# Patient Record
Sex: FEMALE | Race: WHITE | NOT HISPANIC OR LATINO | URBAN - METROPOLITAN AREA
[De-identification: names, ages, dates, MRNs, and addresses within clinical notes are randomized per-mention and may not be internally consistent; named-entity substitution may affect disease eponyms.]

---

## 2017-04-24 ENCOUNTER — IMPORTED ENCOUNTER (OUTPATIENT)
Dept: URBAN - METROPOLITAN AREA CLINIC 50 | Facility: CLINIC | Age: 47
End: 2017-04-24

## 2018-04-23 ENCOUNTER — IMPORTED ENCOUNTER (OUTPATIENT)
Dept: URBAN - METROPOLITAN AREA CLINIC 50 | Facility: CLINIC | Age: 48
End: 2018-04-23

## 2018-11-30 NOTE — PATIENT DISCUSSION
"""Informed patient that their cataract(s) are not visually significant or do not meet "" ,DirectAddress_Unknown

## 2019-05-02 ENCOUNTER — IMPORTED ENCOUNTER (OUTPATIENT)
Dept: URBAN - METROPOLITAN AREA CLINIC 50 | Facility: CLINIC | Age: 49
End: 2019-05-02

## 2020-05-05 ENCOUNTER — IMPORTED ENCOUNTER (OUTPATIENT)
Dept: URBAN - METROPOLITAN AREA CLINIC 50 | Facility: CLINIC | Age: 50
End: 2020-05-05

## 2021-04-17 ASSESSMENT — TONOMETRY
OS_IOP_MMHG: 14
OD_IOP_MMHG: 11
OD_IOP_MMHG: 14
OD_IOP_MMHG: 14
OS_IOP_MMHG: 13
OD_IOP_MMHG: 14
OS_IOP_MMHG: 11
OS_IOP_MMHG: 13

## 2021-04-17 ASSESSMENT — VISUAL ACUITY
OS_CC: 20/25-
OD_OTHER: 20/30. 20/30.
OD_CC: J2
OS_CC: 20/30-1
OS_CC: 20/30
OD_CC: 20/20=
OS_CC: J2
OS_CC: 20/30=
OD_CC: 20/20
OS_CC: J3
OS_BAT: 20/40
OD_CC: J3
OS_CC: J1+
OD_CC: 20/25
OD_BAT: 20/30
OD_CC: J1+
OD_CC: 20/20
OS_OTHER: 20/40. 20/50.

## 2021-05-04 ENCOUNTER — COMPREHENSIVE EXAM (OUTPATIENT)
Dept: URBAN - METROPOLITAN AREA CLINIC 52 | Facility: CLINIC | Age: 51
End: 2021-05-04

## 2021-05-04 DIAGNOSIS — H18.513: ICD-10-CM

## 2021-05-04 DIAGNOSIS — H52.11: ICD-10-CM

## 2021-05-04 DIAGNOSIS — H52.12: ICD-10-CM

## 2021-05-04 DIAGNOSIS — H25.12: ICD-10-CM

## 2021-05-04 DIAGNOSIS — H25.811: ICD-10-CM

## 2021-05-04 PROCEDURE — 92014 COMPRE OPH EXAM EST PT 1/>: CPT

## 2021-05-04 PROCEDURE — 92015 DETERMINE REFRACTIVE STATE: CPT

## 2021-05-04 ASSESSMENT — VISUAL ACUITY
OS_GLARE: 20/30
OD_GLARE: 20/30
OD_GLARE: 20/40
OS_CC: 20/25
OU_CC: 20/20
OU_CC: J1
OU_SC: J1+
OS_GLARE: 20/40
OD_CC: 20/20-2

## 2021-05-04 ASSESSMENT — TONOMETRY
OD_IOP_MMHG: 13
OS_IOP_MMHG: 13

## 2022-05-17 ENCOUNTER — COMPREHENSIVE EXAM (OUTPATIENT)
Dept: URBAN - METROPOLITAN AREA CLINIC 49 | Facility: CLINIC | Age: 52
End: 2022-05-17

## 2022-05-17 DIAGNOSIS — H52.12: ICD-10-CM

## 2022-05-17 DIAGNOSIS — H25.811: ICD-10-CM

## 2022-05-17 DIAGNOSIS — H25.12: ICD-10-CM

## 2022-05-17 DIAGNOSIS — H18.513: ICD-10-CM

## 2022-05-17 DIAGNOSIS — H52.11: ICD-10-CM

## 2022-05-17 PROCEDURE — 92014 COMPRE OPH EXAM EST PT 1/>: CPT

## 2022-05-17 PROCEDURE — 92015 DETERMINE REFRACTIVE STATE: CPT

## 2022-05-17 ASSESSMENT — VISUAL ACUITY
OS_CC: 20/25-2
OD_GLARE: 20/25
OD_CC: 20/20-1
OD_GLARE: 20/30
OS_GLARE: 20/30
OU_SC: J1+
OS_GLARE: 20/50
OU_CC: J1

## 2022-05-17 ASSESSMENT — TONOMETRY
OS_IOP_MMHG: 13
OD_IOP_MMHG: 14

## 2022-05-17 NOTE — PATIENT DISCUSSION
Patient to continue all future care with an  Optom at this time; to be referred back over when ready for Cataract surgery but at this time does not look like patient will need surgical intervention for a couple of years.

## 2023-08-23 ENCOUNTER — PREPPED CHART (OUTPATIENT)
Dept: URBAN - METROPOLITAN AREA CLINIC 53 | Facility: CLINIC | Age: 53
End: 2023-08-23

## 2024-06-10 ENCOUNTER — COMPREHENSIVE EXAM (OUTPATIENT)
Dept: URBAN - METROPOLITAN AREA CLINIC 53 | Facility: CLINIC | Age: 54
End: 2024-06-10

## 2024-06-10 DIAGNOSIS — H53.032: ICD-10-CM

## 2024-06-10 DIAGNOSIS — H52.12: ICD-10-CM

## 2024-06-10 DIAGNOSIS — Z01.00: ICD-10-CM

## 2024-06-10 DIAGNOSIS — H25.811: ICD-10-CM

## 2024-06-10 DIAGNOSIS — H18.513: ICD-10-CM

## 2024-06-10 DIAGNOSIS — H25.12: ICD-10-CM

## 2024-06-10 PROCEDURE — 92015 DETERMINE REFRACTIVE STATE: CPT

## 2024-06-10 PROCEDURE — 92014 COMPRE OPH EXAM EST PT 1/>: CPT

## 2024-06-10 PROCEDURE — 92134 CPTRZ OPH DX IMG PST SGM RTA: CPT | Mod: NC

## 2024-06-10 ASSESSMENT — VISUAL ACUITY
OS_PH: 20/30
OD_GLARE: 20/25
OS_GLARE: 20/40
OD_CC: J1+@14"
OU_CC: 20/25
OD_CC: 20/25
OS_GLARE: 20/50
OS_CC: J1@14"
OS_CC: 20/40
OD_GLARE: 20/20
OU_CC: J1+@14"

## 2024-06-10 ASSESSMENT — KERATOMETRY
OS_K2POWER_DIOPTERS: 46.50
OD_K2POWER_DIOPTERS: 46.75
OS_AXISANGLE_DEGREES: 118
OD_AXISANGLE_DEGREES: 136
OD_AXISANGLE2_DEGREES: 46
OD_K1POWER_DIOPTERS: 46.25
OS_K1POWER_DIOPTERS: 46.25
OS_AXISANGLE2_DEGREES: 28

## 2024-06-10 ASSESSMENT — TONOMETRY
OS_IOP_MMHG: 14
OD_IOP_MMHG: 14

## 2025-06-17 ENCOUNTER — COMPREHENSIVE EXAM (OUTPATIENT)
Age: 55
End: 2025-06-17

## 2025-06-17 DIAGNOSIS — H18.513: ICD-10-CM

## 2025-06-17 DIAGNOSIS — H52.4: ICD-10-CM

## 2025-06-17 DIAGNOSIS — Z01.00: ICD-10-CM

## 2025-06-17 DIAGNOSIS — H25.811: ICD-10-CM

## 2025-06-17 DIAGNOSIS — H25.12: ICD-10-CM

## 2025-06-17 PROCEDURE — 92015 DETERMINE REFRACTIVE STATE: CPT

## 2025-06-17 PROCEDURE — 92014 COMPRE OPH EXAM EST PT 1/>: CPT
